# Patient Record
Sex: FEMALE | Race: ASIAN | NOT HISPANIC OR LATINO | Employment: FULL TIME | URBAN - METROPOLITAN AREA
[De-identification: names, ages, dates, MRNs, and addresses within clinical notes are randomized per-mention and may not be internally consistent; named-entity substitution may affect disease eponyms.]

---

## 2018-10-26 ENCOUNTER — OFFICE VISIT (OUTPATIENT)
Dept: INTERNAL MEDICINE | Age: 23
End: 2018-10-26

## 2018-10-26 VITALS
TEMPERATURE: 99.2 F | HEIGHT: 64 IN | WEIGHT: 140 LBS | HEART RATE: 70 BPM | OXYGEN SATURATION: 98 % | SYSTOLIC BLOOD PRESSURE: 110 MMHG | BODY MASS INDEX: 23.9 KG/M2 | DIASTOLIC BLOOD PRESSURE: 64 MMHG

## 2018-10-26 DIAGNOSIS — R53.83 FATIGUE, UNSPECIFIED TYPE: ICD-10-CM

## 2018-10-26 DIAGNOSIS — Z00.00 ROUTINE HEALTH MAINTENANCE: ICD-10-CM

## 2018-10-26 DIAGNOSIS — R10.13 EPIGASTRIC ABDOMINAL PAIN: Primary | ICD-10-CM

## 2018-10-26 PROCEDURE — 99204 OFFICE O/P NEW MOD 45 MIN: CPT | Performed by: INTERNAL MEDICINE

## 2018-10-26 RX ORDER — CYCLOBENZAPRINE HCL 10 MG
TABLET ORAL
Refills: 0 | COMMUNITY
Start: 2018-10-16 | End: 2018-10-26

## 2018-10-26 RX ORDER — PANTOPRAZOLE SODIUM 20 MG/1
20 TABLET, DELAYED RELEASE ORAL
COMMUNITY
Start: 2018-08-14 | End: 2018-10-26

## 2018-10-26 RX ORDER — PANTOPRAZOLE SODIUM 40 MG/1
40 TABLET, DELAYED RELEASE ORAL DAILY
Qty: 30 TABLET | Refills: 1 | Status: SHIPPED | OUTPATIENT
Start: 2018-10-26 | End: 2021-04-05

## 2018-10-26 NOTE — PROGRESS NOTES
"[facility] [place] INTERNAL MEDICINE  KISHA BLACK M.D.      Matthewramon Pierre / 22 y.o. / female  10/26/2018      ASSESSMENT & PLAN:    Problem List Items Addressed This Visit        High    Epigastric abdominal pain - Primary    Current Assessment & Plan     Possible gastritis/PUD. Check H. Pylori serology. Start pantoprazole 40 mg qd x 1 month. Avoid NSAIDS and caffeine. Consider EGD if not improved.          Relevant Medications    pantoprazole (PROTONIX) 40 MG EC tablet    Other Relevant Orders    Helicobacter Pylori, IgA IgG IgM      Other Visit Diagnoses     Fatigue, unspecified type        Relevant Orders    TSH+Free T4    Routine health maintenance        Relevant Orders    Ambulatory Referral to Gynecology    Flucelvax Quad=>4Years (PFS)        Orders Placed This Encounter   Procedures   • Flucelvax Quad=>4Years (PFS)   • Helicobacter Pylori, IgA IgG IgM   • TSH+Free T4   • Ambulatory Referral to Gynecology     New Medications Ordered This Visit   Medications   • pantoprazole (PROTONIX) 40 MG EC tablet     Sig: Take 1 tablet by mouth Daily.     Dispense:  30 tablet     Refill:  1       Summary/Discussion:      Return in about 3 months (around 1/26/2019) for Reassess today's problem(s).    ____________________________________________________________________    VITALS:    Visit Vitals  /64   Pulse 70   Temp 99.2 °F (37.3 °C)   Ht 162.6 cm (64\")   Wt 63.5 kg (140 lb)   SpO2 98%   BMI 24.03 kg/m²       BP Readings from Last 3 Encounters:   10/26/18 110/64   08/12/18 102/58   06/16/18 119/79     Wt Readings from Last 3 Encounters:   10/26/18 63.5 kg (140 lb)      Body mass index is 24.03 kg/m².    CC: Main reason(s) for today's visit: Establish Care (did not have former physician) and F/U ER visit Abdominal pain (8/14/2018)      HPI:    Patient is a 22 y.o. female who is here to establish care. She was seen at [facility] ED 8/14/18 for epigastric abdominal pain. CBC was normal. GB US was negative. She complains of ongoing dyspepsia " without melena or bleeding. Denies heartburn or dysphagia or early satiety. Drinks lot of caffeine/energy drinks. Denies heavy alcohol use. Takes OTC NSAIDS intermittently.       Patient Care Team:  Cyrus Case MD as PCP - General (Internal Medicine)  ____________________________________________________________________      REVIEW OF SYSTEMS    Review of Systems   Constitutional: Positive for fatigue and unexpected weight change (wt gain). Negative for appetite change.   HENT: Negative.    Eyes: Negative.    Respiratory: Negative.    Cardiovascular: Negative.    Gastrointestinal: Positive for abdominal pain. Negative for blood in stool.   Endocrine: Negative.    Genitourinary: Negative.    Musculoskeletal: Negative.    Skin: Negative.    Allergic/Immunologic: Negative.    Neurological: Negative.    Hematological: Negative.    Psychiatric/Behavioral: Negative.        PHYSICAL EXAMINATION    Physical Exam   Constitutional: She is oriented to person, place, and time. She appears well-developed and well-nourished. No distress.   HENT:   Head: Normocephalic and atraumatic.   Right Ear: Tympanic membrane normal.   Left Ear: Tympanic membrane normal.   Mouth/Throat: Oropharynx is clear and moist and mucous membranes are normal. No oral lesions.   Eyes: Conjunctivae are normal. No scleral icterus.   Neck: Neck supple. No tracheal deviation present. No thyroid mass and no thyromegaly present.   Cardiovascular: Normal rate, regular rhythm, normal heart sounds and intact distal pulses.    Pulmonary/Chest: Effort normal and breath sounds normal.   Abdominal: Soft. Bowel sounds are normal. She exhibits no distension and no mass. There is no hepatosplenomegaly. There is tenderness in the epigastric area. There is no guarding and negative Byers's sign. No hernia.   Musculoskeletal: She exhibits no edema.   Lymphadenopathy:     She has no cervical adenopathy.        Right: No supraclavicular adenopathy present.        Left: No  supraclavicular adenopathy present.   Neurological: She is alert and oriented to person, place, and time. She has normal reflexes. No cranial nerve deficit. She exhibits normal muscle tone. Coordination normal.   Skin: Skin is warm. No rash noted. No pallor.   Psychiatric: She has a normal mood and affect. Her behavior is normal. Judgment and thought content normal.         REVIEWED DATA:    Labs:     [facility] ED labs: normal lipase, Hgb     No results found for: NA, K, AST, ALT, BUN, CREATININE, EGFRIFNONA, EGFRIFAFRI    No results found for: GLUCOSE, HGBA1C, MICROALBUR    No results found for: LDL, HDL, TRIG, CHOLHDLRATIO    No results found for: TSH, FREET4     No results found for: WBC, HGB, PLT      Imaging:   GB US negative     Medical Tests:        Summary of old records / correspondence / consultant report:        Request outside records:        ______________________________________________________________________    ALLERGIES  No Known Allergies     MEDICATIONS  Current Outpatient Prescriptions   Medication Sig Dispense Refill   • pantoprazole (PROTONIX) 40 MG EC tablet Take 1 tablet by mouth Daily. 30 tablet 1     No current facility-administered medications for this visit.        PFSH:     The following portions of the patient's history were reviewed and updated as appropriate: Allergies / Current Medications / Past Medical History / Surgical History / Social History / Family History    PROBLEM LIST   Patient Active Problem List   Diagnosis   • Epigastric abdominal pain       PAST MEDICAL HISTORY  History reviewed. No pertinent past medical history.    SURGICAL HISTORY  Past Surgical History:   Procedure Laterality Date   • HERNIA REPAIR      3 months       SOCIAL HISTORY  Social History     Social History   • Marital status: Single     Occupational History   • student      Social History Main Topics   • Smoking status: Never Smoker   • Smokeless tobacco: Never Used   • Alcohol use 1.2 - 1.8 oz/week     2 - 3  Standard drinks or equivalent per week   • Drug use: No   • Sexual activity: Not Currently     Other Topics Concern   • Not on file       FAMILY HISTORY  Family History   Problem Relation Age of Onset   • Breast cancer Mother 45   • Hypertension Father    • Diabetes type II Father    • Pancreatic cancer Paternal Grandfather    • No Known Problems Sister    • No Known Problems Brother          **Larry Disclaimer:   Much of this encounter note is an electronic transcription/translation of spoken language to printed text. The electronic translation of spoken language may permit erroneous, or at times, nonsensical words or phrases to be inadvertently transcribed. Although I have reviewed the note for such errors, some may still exist.

## 2018-10-26 NOTE — ASSESSMENT & PLAN NOTE
Possible gastritis/PUD. Check H. Pylori serology. Start pantoprazole 40 mg qd x 1 month. Avoid NSAIDS and caffeine. Consider EGD if not improved.

## 2018-10-30 LAB
H PYLORI IGA SER-ACNC: <9 UNITS (ref 0–8.9)
H PYLORI IGG SER IA-ACNC: 0.24 INDEX VALUE (ref 0–0.79)
H PYLORI IGM SER-ACNC: <9 UNITS (ref 0–8.9)
T4 FREE SERPL-MCNC: 1.13 NG/DL (ref 0.93–1.7)
TSH SERPL DL<=0.005 MIU/L-ACNC: 1.31 MIU/ML (ref 0.27–4.2)

## 2018-10-31 ENCOUNTER — TELEPHONE (OUTPATIENT)
Dept: INTERNAL MEDICINE | Age: 23
End: 2018-10-31

## 2018-10-31 NOTE — TELEPHONE ENCOUNTER
----- Message from Cyrus Case MD sent at 10/30/2018  5:15 PM EDT -----  Send result letter to patient.     Sandip, these are your recent lab results:    H. Pylori bacteria serology/antibody tests were negative.   Thyroid level is fine.     Continue plans as discussed.     Please call my office if you have any questions. Otherwise, we can discuss the results in further detail on your next visit.

## 2018-11-02 ENCOUNTER — FLU SHOT (OUTPATIENT)
Dept: INTERNAL MEDICINE | Age: 23
End: 2018-11-02

## 2018-11-02 DIAGNOSIS — Z23 FLU VACCINE NEED: ICD-10-CM

## 2018-11-02 PROCEDURE — 90674 CCIIV4 VAC NO PRSV 0.5 ML IM: CPT | Performed by: INTERNAL MEDICINE

## 2018-11-02 PROCEDURE — 90471 IMMUNIZATION ADMIN: CPT | Performed by: INTERNAL MEDICINE

## 2018-12-12 ENCOUNTER — OFFICE VISIT (OUTPATIENT)
Dept: OBSTETRICS AND GYNECOLOGY | Facility: CLINIC | Age: 23
End: 2018-12-12

## 2018-12-12 VITALS
SYSTOLIC BLOOD PRESSURE: 118 MMHG | BODY MASS INDEX: 22.98 KG/M2 | HEIGHT: 64 IN | DIASTOLIC BLOOD PRESSURE: 68 MMHG | WEIGHT: 134.6 LBS

## 2018-12-12 DIAGNOSIS — Z00.00 ANNUAL PHYSICAL EXAM: Primary | ICD-10-CM

## 2018-12-12 PROCEDURE — 99385 PREV VISIT NEW AGE 18-39: CPT | Performed by: OBSTETRICS & GYNECOLOGY

## 2018-12-12 NOTE — PROGRESS NOTES
ROSALIO Pierre  is a 23 y.o. female who presents for a routine gynecologic exam.  She has never had one before.  She was referred by her primary care physician to initiate care.  She reports that her cycle is regular and predictable.  She is not currently sexually active, but has been in the past.  She does experience intermittent breast pain around the time of her cycle.    Chief Complaint   Patient presents with   • New Gyn     Patient is here for a new gyn annual with breast pain.       History reviewed. No pertinent past medical history.    Past Surgical History:   Procedure Laterality Date   • HERNIA REPAIR      3 months       Social History     Socioeconomic History   • Marital status: Single     Spouse name: Not on file   • Number of children: Not on file   • Years of education: Not on file   • Highest education level: Not on file   Social Needs   • Financial resource strain: Not on file   • Food insecurity - worry: Not on file   • Food insecurity - inability: Not on file   • Transportation needs - medical: Not on file   • Transportation needs - non-medical: Not on file   Occupational History   • Occupation: student   Tobacco Use   • Smoking status: Never Smoker   • Smokeless tobacco: Never Used   Substance and Sexual Activity   • Alcohol use: Yes     Alcohol/week: 1.2 - 1.8 oz     Types: 2 - 3 Standard drinks or equivalent per week   • Drug use: No   • Sexual activity: Not Currently   Other Topics Concern   • Not on file   Social History Narrative   • Not on file       The following portions of the patient's history were reviewed and updated as appropriate: allergies, current medications, past family history, past medical history, past social history, past surgical history and problem list.    Review of Systems   Constitutional: Negative.    HENT: Negative.    Respiratory: Negative.    Cardiovascular: Negative.    Gastrointestinal: Negative.    Genitourinary: Negative.    Musculoskeletal: Negative.     Skin: Negative.    Allergic/Immunologic: Negative.    Psychiatric/Behavioral: Negative.              Physical Exam   Constitutional: She is oriented to person, place, and time. She appears well-developed and well-nourished.   HENT:   Head: Normocephalic and atraumatic.   Cardiovascular: Normal rate and regular rhythm.   Pulmonary/Chest: Effort normal and breath sounds normal. She has no wheezes. She has no rales.   The breasts are equal in size.  There are no palpable lumps.  Nipple discharge and axillary adenopathy are absent.   Abdominal: Soft. She exhibits no distension. There is no tenderness.   Genitourinary: Vagina normal and uterus normal. There is no lesion on the right labia. There is no lesion on the left labia. Cervix exhibits no motion tenderness. Right adnexum displays no mass and no tenderness. Left adnexum displays no mass and no tenderness. No vaginal discharge found.   Neurological: She is alert and oriented to person, place, and time.   Skin: Skin is warm and dry.   Nursing note and vitals reviewed.      Assessment    aSndip was seen today for new gyn.    Diagnoses and all orders for this visit:    Annual physical exam        Plan  1. Normal gynecologic exam.  Pap performed.  2. Counseled regarding safe sex practices.  Currently, the patient uses condoms when having intercourse.  She declines any other form of contraception.  3. Intermittent breast pain around the time of menstruation.  Counseled.  There are no abnormalities on breast examination today.  We discussed the possibility of controlling this through the use of oral contraceptive pills.  The patient declines this for now.    4. Return in about 1 year (around 12/12/2019).    Social History     Tobacco Use   Smoking Status Never Smoker   5.     6.

## 2018-12-14 LAB
C TRACH RRNA CVX QL NAA+PROBE: NEGATIVE
CONV .: NORMAL
CYTOLOGIST CVX/VAG CYTO: NORMAL
CYTOLOGY CVX/VAG DOC THIN PREP: NORMAL
DX ICD CODE: NORMAL
HIV 1 & 2 AB SER-IMP: NORMAL
N GONORRHOEA RRNA CVX QL NAA+PROBE: NEGATIVE
OTHER STN SPEC: NORMAL
PATH REPORT.FINAL DX SPEC: NORMAL
STAT OF ADQ CVX/VAG CYTO-IMP: NORMAL
T VAGINALIS RRNA SPEC QL NAA+PROBE: NEGATIVE

## 2019-10-12 DIAGNOSIS — R10.13 EPIGASTRIC ABDOMINAL PAIN: ICD-10-CM

## 2019-10-14 RX ORDER — PANTOPRAZOLE SODIUM 40 MG/1
40 TABLET, DELAYED RELEASE ORAL DAILY
Qty: 30 TABLET | Refills: 0 | OUTPATIENT
Start: 2019-10-14

## 2019-11-14 ENCOUNTER — OFFICE VISIT (OUTPATIENT)
Dept: INTERNAL MEDICINE | Age: 24
End: 2019-11-14

## 2019-11-14 VITALS
OXYGEN SATURATION: 99 % | HEART RATE: 71 BPM | HEIGHT: 64 IN | WEIGHT: 150 LBS | TEMPERATURE: 97.3 F | BODY MASS INDEX: 25.61 KG/M2 | DIASTOLIC BLOOD PRESSURE: 70 MMHG | SYSTOLIC BLOOD PRESSURE: 100 MMHG

## 2019-11-14 DIAGNOSIS — K59.00 CONSTIPATION, UNSPECIFIED CONSTIPATION TYPE: Primary | ICD-10-CM

## 2019-11-14 DIAGNOSIS — K92.1 BLOOD IN STOOL: ICD-10-CM

## 2019-11-14 DIAGNOSIS — Z23 ENCOUNTER FOR IMMUNIZATION: ICD-10-CM

## 2019-11-14 PROCEDURE — 99213 OFFICE O/P EST LOW 20 MIN: CPT | Performed by: INTERNAL MEDICINE

## 2019-11-14 PROCEDURE — 90471 IMMUNIZATION ADMIN: CPT | Performed by: INTERNAL MEDICINE

## 2019-11-14 PROCEDURE — 90674 CCIIV4 VAC NO PRSV 0.5 ML IM: CPT | Performed by: INTERNAL MEDICINE

## 2019-11-14 RX ORDER — NORGESTIMATE AND ETHINYL ESTRADIOL 7DAYSX3 28
KIT ORAL
Refills: 0 | COMMUNITY
Start: 2019-10-11

## 2019-11-14 RX ORDER — MINOCYCLINE HYDROCHLORIDE 100 MG/1
CAPSULE ORAL
Refills: 0 | COMMUNITY
Start: 2019-10-11 | End: 2022-11-16

## 2019-11-14 NOTE — PATIENT INSTRUCTIONS
** IMPORTANT MESSAGE FROM DR. BLACK **    In our office, your satisfaction is VERY important to us.     You may receive a survey from [employer] by mail or E-mail for you to provide feedback about your visit. This information is invaluable for me to know what we can do to improve our services.     I ask that you please take a few minutes to complete the survey and let us know how we are doing in serving your needs. (You may receive the survey more than once for multiple visits)    Thank You !    Dr. Black & Staff    __________________________________________________________      ADDITIONAL INSTRUCTION / REMINDERS FROM DR. BLACK    FOR CONSTIPATION:    1. STAY ACTIVE  2. DRINK PLENTY OF WATER  3. PLENTY OF DIETARY FIBERS  4. BENEFIBER DAILY IN THE MORNING  5. STOOL SOFTENER (COLACE/DOCUSATE) 200 MG DAILY  6. MIRALAX POWDER 17 GM DAILY.   7. USE DULCOLAX STIMULANT LAXATIVE ONLY AS NEEDED  8. PREPARATION H MAXIMUM STRENGTH OINMENT NIGHTLY AND AFTER BOWEL MOVEMENTS

## 2019-11-14 NOTE — PROGRESS NOTES
"[facility] [place] [facility]NAL MEDICINE  KISHA BLACK M.D.      Sandip Pierre / 23 y.o. / female  11/14/2019      CC:  Main reason(s) for today's visit: Constipation      HPI:     1 month of worsening constipation after starting OCP and minocycline for acne. Has had very difficult bowel movements with blood on the stool/wiping. She has had weight gain from not watching her diet. Denies nausea/vomiting. No melena or blood in stool except as noted. Denies abdominal pain.       Patient Care Team:  Cyrus Black MD as PCP - General (Internal Medicine)    ASSESSMENT & PLAN:    1. Constipation, unspecified constipation type    2. Blood in stool    3. Encounter for immunization      Orders Placed This Encounter   Procedures   • Flucelvax Quad=>4Years ([id number])     No orders of the defined types were placed in this encounter.       Summary/Discussion:  Bowel regimen including increased water intake, staying active, colace, Benefiber, Miralax if needed. Dulcolax PRN only.   Preparation H Max as needed.   Instructed to call/return if not improved by 1 month or if worse. She expressed understanding and agreed to follow above instructions.       Next Appointment with me: Visit date not found    No Follow-up on file.    ____________________________________________________________________    MEDICATIONS  Current Outpatient Medications   Medication Sig Dispense Refill   • minocycline (MINOCIN,DYNACIN) 100 MG capsule   0   • TRI-SPRINTEC 0.18/0.215/0.25 MG-35 MCG per tablet   0   • pantoprazole (PROTONIX) 40 MG EC tablet Take 1 tablet by mouth Daily. 30 tablet 1     No current facility-administered medications for this visit.           ____________________________________________________________________      REVIEW OF SYSTEMS    Review of Systems  Weight gain  No nausea/vomiting, melena or abdominal pain   neg  Psych neg     VITALS    Visit Vitals  /70   Pulse 71   Temp 97.3 °F (36.3 °C)   Ht 162.6 cm (64\")   Wt 68 kg (150 lb)   SpO2 99%   BMI " 25.75 kg/m²       BP Readings from Last 3 Encounters:   11/14/19 100/70   12/12/18 118/68   10/26/18 110/64     Wt Readings from Last 3 Encounters:   11/14/19 68 kg (150 lb)   12/12/18 61.1 kg (134 lb 9.6 oz)   10/26/18 63.5 kg (140 lb)      Body mass index is 25.75 kg/m².    PHYSICAL EXAMINATION    Physical Exam   Constitutional:   Noted weight gain   Abdominal: Soft. Normal appearance and bowel sounds are normal. She exhibits no mass. There is no hepatosplenomegaly. There is no tenderness.   Psychiatric: She has a normal mood and affect. Judgment normal.           REVIEWED DATA:    Labs:   Lab Results   Component Value Date    WBC 6.86 08/14/2018    HGB 13.9 08/14/2018     08/14/2018      Lab Results   Component Value Date    TSH 1.310 10/26/2018    FREET4 1.13 10/26/2018        Imaging:        Medical Tests:       Summary of old records / correspondence / consultant report:        Request outside records:       ALLERGIES  No Known Allergies     PFSH:     The following portions of the patient's history were reviewed and updated as appropriate: Allergies / Current Medications / Past Medical History / Surgical History / Social History / Family History    PROBLEM LIST   Patient Active Problem List   Diagnosis   • Epigastric abdominal pain       PAST MEDICAL HISTORY  History reviewed. No pertinent past medical history.    SURGICAL HISTORY  Past Surgical History:   Procedure Laterality Date   • HERNIA REPAIR      3 months       SOCIAL HISTORY  Social History     Socioeconomic History   • Marital status: Single     Spouse name: Not on file   • Number of children: Not on file   • Years of education: Not on file   • Highest education level: Not on file   Occupational History   • Occupation: student   Tobacco Use   • Smoking status: Never Smoker   • Smokeless tobacco: Never Used   Substance and Sexual Activity   • Alcohol use: Yes     Alcohol/week: 1.2 - 1.8 oz     Types: 2 - 3 Standard drinks or equivalent per week    • Drug use: No   • Sexual activity: Not Currently       FAMILY HISTORY  Family History   Problem Relation Age of Onset   • Breast cancer Mother 45   • Hypertension Father    • Diabetes type II Father    • Pancreatic cancer Paternal Grandfather    • No Known Problems Sister    • No Known Problems Brother          **Larry Disclaimer:   Much of this encounter note is an electronic transcription/translation of spoken language to printed text. The electronic translation of spoken language may permit erroneous, or at times, nonsensical words or phrases to be inadvertently transcribed. Although I have reviewed the note for such errors, some may still exist.       Template created by Sophie Case MD

## 2020-08-07 ENCOUNTER — TELEPHONE (OUTPATIENT)
Dept: INTERNAL MEDICINE | Age: 25
End: 2020-08-07

## 2020-08-07 NOTE — TELEPHONE ENCOUNTER
Patient called our office stating her brother was having all the symptoms of COVID-19. The patient is not have any symptoms at this time and wanted to be COVID tested. I advised her to call [facility] to see what or where she needed to go for COVID testing.

## 2021-03-26 DIAGNOSIS — Z00.00 HEALTHCARE MAINTENANCE: Primary | ICD-10-CM

## 2021-03-29 ENCOUNTER — TELEPHONE (OUTPATIENT)
Dept: INTERNAL MEDICINE | Age: 26
End: 2021-03-29

## 2021-03-29 NOTE — TELEPHONE ENCOUNTER
Caller: Sandip Pierre    Relationship to patient: Self    Best call back number: [phone]    Chief complaint: BLOOD WORK    Type of visit: LAB    Requested date: ANY DAY BUT Wednesday IN THE AFTERNOON     If rescheduling, when is the original appointment: 03/29/21    Additional notes: THE PATIENT FORGOT ABOUT THE APPOINTMENT AND WOULD LIKE TO RESCHEDULE THE LAB. PLEASE RETURN CALL AND ADVISE.

## 2021-04-05 ENCOUNTER — OFFICE VISIT (OUTPATIENT)
Dept: INTERNAL MEDICINE | Age: 26
End: 2021-04-05

## 2021-04-05 VITALS
WEIGHT: 149.2 LBS | DIASTOLIC BLOOD PRESSURE: 72 MMHG | HEART RATE: 86 BPM | BODY MASS INDEX: 24.86 KG/M2 | SYSTOLIC BLOOD PRESSURE: 116 MMHG | TEMPERATURE: 98.4 F | HEIGHT: 65 IN | OXYGEN SATURATION: 99 %

## 2021-04-05 DIAGNOSIS — Z00.00 ENCOUNTER FOR ANNUAL PHYSICAL EXAM: Primary | ICD-10-CM

## 2021-04-05 DIAGNOSIS — Z12.4 SCREENING FOR CERVICAL CANCER: ICD-10-CM

## 2021-04-05 DIAGNOSIS — Z23 NEED FOR HPV VACCINATION: ICD-10-CM

## 2021-04-05 PROCEDURE — 99395 PREV VISIT EST AGE 18-39: CPT | Performed by: NURSE PRACTITIONER

## 2021-04-05 NOTE — PROGRESS NOTES
"[facility] [place]  JEAN Smallwood      Sandip Ignacio / 25 y.o. / female  04/05/2021      VITALS     Visit Vitals  /72   Pulse 86   Temp 98.4 °F (36.9 °C) (Temporal)   Ht 165.1 cm (65\")   Wt 67.7 kg (149 lb 3.2 oz)   LMP 04/01/2021 (Exact Date)   SpO2 99%   Breastfeeding No   BMI 24.83 kg/m²       BP Readings from Last 3 Encounters:   04/05/21 116/72   11/14/19 100/70   12/12/18 118/68     Wt Readings from Last 3 Encounters:   04/05/21 67.7 kg (149 lb 3.2 oz)   11/14/19 68 kg (150 lb)   12/12/18 61.1 kg (134 lb 9.6 oz)      Body mass index is 24.83 kg/m².    MEDICATIONS     Current Outpatient Medications   Medication Sig Dispense Refill   • minocycline (MINOCIN,DYNACIN) 100 MG capsule   0   • TRI-SPRINTEC 0.18/0.215/0.25 MG-35 MCG per tablet   0     Current Facility-Administered Medications   Medication Dose Route Frequency Provider Last Rate Last Admin   • HPV 9-Valent Recomb Vaccine suspension 1 dose  1 dose Intramuscular Once Casanova JEAN Olson           _____________________________________________________________________________________    CHIEF COMPLAINT     Annual Exam      HISTORY OF PRESENT ILLNESS     Sandip presents for annual health maintenance visit.    · Last health maintenance visit: approximately 3 years ago  · General health: good  · Lifestyle:  · Attempting to lose weight?: Yes   · Diet: eats moderately healthy  · Exercise: exercises 3 days/week  · Tobacco: Never used   · Alcohol: occasional/rare  · Work: Student  · Reproductive health:  · Sexually active?: Yes   · Sexual problems?: No problems  · Concern for STD?: No    · Sees Gynecologist?: No; referral today   · Viola/Postmenopausal?: No   · Domestic abuse concerns: No   · Depression Screening:      PHQ-2/PHQ-9 Depression Screening 4/5/2021   Little interest or pleasure in doing things 0   Feeling down, depressed, or hopeless 0   Total Score 0         PHQ-2: 0 (Not depressed)   PHQ-9: 0 (Negative screening for depression)    Patient " Care Team:  Cyrus Case MD as PCP - General (Internal Medicine)  Oscar Tavarez MD as Consulting Physician (Dermatology)  ______________________________________________________________________    ALLERGIES  No Known Allergies     PFSH:     The following portions of the patient's history were reviewed and updated as appropriate: Allergies / Current Medications / Past Medical History / Surgical History / Social History / Family History    PROBLEM LIST   Patient Active Problem List   Diagnosis   • Epigastric abdominal pain       PAST MEDICAL HISTORY  History reviewed. No pertinent past medical history.    SURGICAL HISTORY  Past Surgical History:   Procedure Laterality Date   • HERNIA REPAIR      3 months       SOCIAL HISTORY  Social History     Socioeconomic History   • Marital status: Single     Spouse name: Not on file   • Number of children: Not on file   • Years of education: Not on file   • Highest education level: Not on file   Tobacco Use   • Smoking status: Never Smoker   • Smokeless tobacco: Never Used   Vaping Use   • Vaping Use: Never used   Substance and Sexual Activity   • Alcohol use: Yes     Alcohol/week: 2.0 - 3.0 standard drinks     Types: 2 - 3 Standard drinks or equivalent per week   • Drug use: No   • Sexual activity: Yes     Partners: Male     Birth control/protection: Other, Condom       FAMILY HISTORY  Family History   Problem Relation Age of Onset   • Breast cancer Mother 45   • Hypertension Father    • Diabetes type II Father    • Pancreatic cancer Paternal Grandfather    • No Known Problems Sister    • No Known Problems Brother    • Stomach cancer Maternal Aunt        IMMUNIZATION HISTORY  Immunization History   Administered Date(s) Administered   • Flu Vaccine Split Quad 10/13/2020   • Influenza, Unspecified 09/09/2020   • TD Preservative Free 06/09/2018   • flucelvax quad pfs =>4 YRS 11/02/2018, 11/14/2019        ______________________________________________________________________    REVIEW OF SYSTEMS     Review of Systems   Constitutional: Negative.    HENT: Negative.    Eyes: Negative.    Respiratory: Negative.    Cardiovascular: Negative.    Gastrointestinal: Negative.    Endocrine: Negative.    Genitourinary: Negative.    Musculoskeletal: Negative.    Skin: Negative.    Allergic/Immunologic: Negative.    Neurological: Negative.    Hematological: Negative.    Psychiatric/Behavioral: Negative.          PHYSICAL EXAMINATION     Physical Exam  Constitutional:       Appearance: Normal appearance. She is normal weight.   HENT:      Head: Normocephalic and atraumatic.      Right Ear: Tympanic membrane normal.      Left Ear: Tympanic membrane normal.      Nose: Nose normal.      Mouth/Throat:      Mouth: Mucous membranes are moist.   Eyes:      Conjunctiva/sclera: Conjunctivae normal.      Pupils: Pupils are equal, round, and reactive to light.   Neck:      Thyroid: No thyromegaly.      Vascular: No carotid bruit.   Cardiovascular:      Rate and Rhythm: Normal rate and regular rhythm.      Pulses: Normal pulses.      Heart sounds: Normal heart sounds.   Pulmonary:      Effort: Pulmonary effort is normal.      Breath sounds: Normal breath sounds.   Abdominal:      Palpations: Abdomen is soft.      Tenderness: There is no abdominal tenderness. There is no right CVA tenderness or left CVA tenderness.   Genitourinary:     Comments: Referral to gynecology   Musculoskeletal:         General: Normal range of motion.      Cervical back: Normal range of motion.      Right lower leg: No edema.      Left lower leg: No edema.   Lymphadenopathy:      Cervical: No cervical adenopathy.   Skin:     General: Skin is warm and dry.   Neurological:      Mental Status: She is alert and oriented to person, place, and time.   Psychiatric:         Thought Content: Thought content normal.         Judgment: Judgment normal.         REVIEWED DATA       Labs:    Lab Results   Component Value Date     03/30/2021    K 4.3 03/30/2021    CALCIUM 9.4 03/30/2021    AST 14 03/30/2021    ALT 18 03/30/2021    BUN 12 03/30/2021    CREATININE 0.53 (L) 03/30/2021    CREATININE 0.5 (L) 08/14/2018    EGFRIFNONA 141 03/30/2021    EGFRIFAFRI >150 03/30/2021       Lab Results   Component Value Date    GLU 94 03/30/2021    HGBA1C 5.10 03/30/2021    TSH 2.490 03/30/2021    FREET4 1.13 10/26/2018       Lab Results   Component Value Date    LDL 62 03/30/2021    HDL 62 (H) 03/30/2021    TRIG 182 (H) 03/30/2021    CHOLHDLRATIO 2.48 03/30/2021       No results found for: [id number]     Lab Results   Component Value Date    WBC 8.47 03/30/2021    HGB 13.6 03/30/2021    MCV 88.0 03/30/2021     03/30/2021       Lab Results   Component Value Date    PROTEIN Negative 03/30/2021    GLUCOSEU Negative 03/30/2021    BLOODU Negative 03/30/2021    NITRITEU Negative 03/30/2021    LEUKOCYTESUR Negative 03/30/2021          Lab Results   Component Value Date    HEPCVIRUSABY <0.1 03/30/2021       Imaging:        Medical Tests:        ASSESSMENT & PLAN     ANNUAL WELLNESS EXAM / PHYSICAL     Other medical problems addressed today:      Summary/Discussion:     · Minimally elevated triglycerides in diet. Decrease saturated fats.   · Hep A, HPV recommended at pharmacy as first COVID-19 vaccination 04/15.   · Unable to complete pap smear today. On menses. Referral to gynecology. Also need to discuss mammogram with mother breast cancer history at 45.   · Follow-up in 1 year for annual physical     Next Appointment with me: Visit date not found    No follow-ups on file.      HEALTHCARE MAINTENANCE ISSUES     Cancer Screening:  · Colon: Initial/Next screening at age: 45  · Repeat colon cancer screening: N/A at this time  · Breast: Recommended monthly self exams; annual professional exam  · Mammogram: N/A at this time  · Cervical: 3 years  · Skin: Monthly self skin examination, annual exam by  health professional  · Lung: Does not meet criteria for lung cancer screening.   · Other:    Screening Labs & Tests:  · Lab results reviewed & discussed with the patient or test orders placed today.  · EKG:  · CV Screening: Lipid panel  · DEXA (65+ or postmenopausal with risk factors):   · HEP C (If born 0181-7[birth year], or risk factors): Negative screen  · Other:     Immunization/Vaccinations (to be given today unless deferred by patient)  · Influenza: Patient had the flu shot this season  · Hepatitis A: Recommended here or at pharmacy  · Tetanus/Pertussis: Up to date  · Pneumovax: Not needed at this time  · Shingles: Not needed at this time  · Other: HPV needed. COVID-19 vaccine April 15    Lifestyle Counseling:  · Lifestyle Modifications: Continue good lifestyle choices/modifications, Increase intensity/regularity of aerobic exercise and Follow a low fat, low cholesterol diet  · Safety Issues: Always wear seatbelt, Avoid texting while driving   · Use sunscreen, regular skin examination  · Recommended annual dental/vision examination.  · Emotional/Stress/Sleep: Reviewed and  given when appropriate      Health Maintenance   Topic Date Due   • HPV VACCINES (1 - 2-dose series) Never done   • PAP SMEAR  Never done   • INFLUENZA VACCINE  08/01/2021   • ANNUAL PHYSICAL  04/06/2022   • TDAP/TD VACCINES (2 - Tdap) 06/09/2028   • HEPATITIS C SCREENING  Completed   • Pneumococcal Vaccine 0-64  Aged Out   • MENINGOCOCCAL VACCINE  Aged Out         Examiner was wearing KN95 mask, face shield and exam gloves during the entire duration of the visit. Patient was masked the entire time.   Minimum social distance of 6 ft maintained entire visit except if physical contact was necessary as documented.     **Dragon Disclaimer:   Much of this encounter note is an electronic transcription/translation of spoken language to printed text. The electronic translation of spoken language may permit erroneous, or at times, nonsensical words or  phrases to be inadvertently transcribed. Although I have reviewed the note for such errors, some may still exist.

## 2021-04-08 ENCOUNTER — TELEPHONE (OUTPATIENT)
Dept: INTERNAL MEDICINE | Age: 26
End: 2021-04-08

## 2021-04-08 NOTE — TELEPHONE ENCOUNTER
----- Message from JEAN Smallwood sent at 4/8/2021 12:48 PM EDT -----  Please clarify with patient. When trying to refer to obgyn, patient is already established with Dr. London.     Is patient wanting new provider?   If not please remind her to schedule follow-up for pap smear and discussion of mammogram with family history of mother's breast cancer at age 45.     Thanks!

## 2021-04-08 NOTE — TELEPHONE ENCOUNTER
Pt is current with Dr. London, but is requesting new GYN that is female. Once this is set up she will have annual pap done. MM

## 2021-04-09 NOTE — TELEPHONE ENCOUNTER
This referral was routed back to [facility]. This referral show ready to randall with SHARAD Meneses. MM

## 2021-04-16 ENCOUNTER — BULK ORDERING (OUTPATIENT)
Dept: CASE MANAGEMENT | Facility: OTHER | Age: 26
End: 2021-04-16

## 2021-04-16 ENCOUNTER — APPOINTMENT (OUTPATIENT)
Dept: VACCINE CLINIC | Facility: HOSPITAL | Age: 26
End: 2021-04-16

## 2021-04-16 DIAGNOSIS — Z23 IMMUNIZATION DUE: ICD-10-CM

## 2021-08-09 ENCOUNTER — OFFICE VISIT (OUTPATIENT)
Dept: INTERNAL MEDICINE | Age: 26
End: 2021-08-09

## 2021-08-09 VITALS
OXYGEN SATURATION: 99 % | BODY MASS INDEX: 23.53 KG/M2 | TEMPERATURE: 98.4 F | DIASTOLIC BLOOD PRESSURE: 70 MMHG | HEART RATE: 94 BPM | SYSTOLIC BLOOD PRESSURE: 116 MMHG | WEIGHT: 141.2 LBS | HEIGHT: 65 IN

## 2021-08-09 DIAGNOSIS — Z71.85 IMMUNIZATION COUNSELING: Primary | ICD-10-CM

## 2021-08-09 PROCEDURE — 90471 IMMUNIZATION ADMIN: CPT | Performed by: NURSE PRACTITIONER

## 2021-08-09 PROCEDURE — 99213 OFFICE O/P EST LOW 20 MIN: CPT | Performed by: NURSE PRACTITIONER

## 2021-08-09 PROCEDURE — 90649 4VHPV VACCINE 3 DOSE IM: CPT | Performed by: NURSE PRACTITIONER

## 2021-08-09 NOTE — PROGRESS NOTES
"    I N T E R N A L  M E D I C I N E  JESSY SMITH, APRN      ENCOUNTER DATE:  08/09/2021    Sandip Pierre / 25 y.o. / female      CHIEF COMPLAINT / REASON FOR OFFICE VISIT     No chief complaint on file.      ASSESSMENT & PLAN     1. Immunization counseling  -Discussed HPV vaccination.  She will need 3 dose as she is 25 years old.  First dose will be administered today with second in 2 months and third 6 months from today's date.  -Follow-up with gynecology for Pap smear.    Orders Placed This Encounter   Procedures   • HPV Vaccine QuadriValent 3 Dose IM     No orders of the defined types were placed in this encounter.      SUMMARY/DISCUSSION  • Follow-up at next scheduled office visit with Dr. Case PCP in September.      Next Appointment with me: Visit date not found    No follow-ups on file.      VITAL SIGNS     Visit Vitals  /70   Pulse 94   Temp 98.4 °F (36.9 °C) (Temporal)   Ht 165.1 cm (65\")   Wt 64 kg (141 lb 3.2 oz)   LMP 07/22/2021 (Exact Date)   SpO2 99%   Breastfeeding No   BMI 23.50 kg/m²       Wt Readings from Last 3 Encounters:   08/09/21 64 kg (141 lb 3.2 oz)   04/05/21 67.7 kg (149 lb 3.2 oz)   11/14/19 68 kg (150 lb)     Body mass index is 23.5 kg/m².      MEDICATIONS AT THE TIME OF OFFICE VISIT     Current Outpatient Medications on File Prior to Visit   Medication Sig   • TRI-SPRINTEC 0.18/0.215/0.25 MG-35 MCG per tablet    • minocycline (MINOCIN,DYNACIN) 100 MG capsule      No current facility-administered medications on file prior to visit.         HISTORY OF PRESENT ILLNESS     Patient presents to discuss HPV vaccination.  Age of 25 with monogamous sexual partner male.  Declines any STI screenings.  She has gynecology appointment in the near future for Pap smear.  No GI symptoms such as vaginal discharge, pain, or genital warts.    REVIEW OF SYSTEMS     Constitutional neg except per HPI   Resp neg  CV neg   neg     PHYSICAL EXAMINATION     Physical Exam  Constitutional  No " distress  Cardiovascular Rate  normal . Rhythm: regular . Heart sounds:  normal  Pulmonary/Chest  Effort normal. Breath sounds:  normal  Psychiatric  Alert. Judgment and thought content normal. Mood normal     REVIEWED DATA     Labs:           Imaging:           Medical Tests:             Summary of old records / correspondence / consultant report:           Request outside records:           *Examiner was wearing medical surgical mask, face shield and exam gloves during the entire duration of the visit. Patient was masked the entire time.   Minimum social distance of 6 ft maintained entire visit except if physical contact was necessary as documented.     **Dragon Disclaimer:   Much of this encounter note is an electronic transcription/translation of spoken language to printed text. The electronic translation of spoken language may permit erroneous, or at times, nonsensical words or phrases to be inadvertently transcribed. Although I have reviewed the note for such errors, some may still exist.

## 2021-10-01 ENCOUNTER — TELEPHONE (OUTPATIENT)
Dept: INTERNAL MEDICINE | Age: 26
End: 2021-10-01

## 2021-10-01 DIAGNOSIS — N94.9 FEMALE GENITAL PROBLEM: Primary | ICD-10-CM

## 2021-10-01 NOTE — TELEPHONE ENCOUNTER
----- Message from Cyrus Case MD sent at 10/1/2021  9:30 AM EDT -----  Regarding: FW: Non-Urgent Medical Question  Contact: [phone]  See if she would like to see our APRN. If not okay to place referral to OBGYN.  ----- Message -----  From: Radha Banegas LPN  Sent: 10/1/2021   7:38 AM EDT  To: Cyrus Case MD  Subject: FW: Non-Urgent Medical Question                    ----- Message -----  From: Sandip Pierre  Sent: 9/30/2021  10:29 PM EDT  [facility]  Subject: Non-Urgent Medical Question                      Hello.    I keep having little pimples around my genital area. It doesn't sore too much unless not disturbed, but I just wanted to see if there's anyway to get this treated. If you could make me a referral to gynecologist, it would be great.    If you rather recommend me see dermatologist instead, I will do so. Thank you.    Nicki Oropeza.

## 2021-10-08 ENCOUNTER — CLINICAL SUPPORT (OUTPATIENT)
Dept: INTERNAL MEDICINE | Age: 26
End: 2021-10-08

## 2021-10-08 DIAGNOSIS — Z23 NEED FOR HPV VACCINATION: Primary | ICD-10-CM

## 2021-10-08 PROCEDURE — 90649 4VHPV VACCINE 3 DOSE IM: CPT | Performed by: INTERNAL MEDICINE

## 2021-10-08 PROCEDURE — 90471 IMMUNIZATION ADMIN: CPT | Performed by: INTERNAL MEDICINE

## 2021-12-13 ENCOUNTER — OFFICE VISIT (OUTPATIENT)
Dept: OBSTETRICS AND GYNECOLOGY | Age: 26
End: 2021-12-13

## 2021-12-13 VITALS
HEIGHT: 65 IN | WEIGHT: 142 LBS | DIASTOLIC BLOOD PRESSURE: 64 MMHG | BODY MASS INDEX: 23.66 KG/M2 | SYSTOLIC BLOOD PRESSURE: 102 MMHG

## 2021-12-13 DIAGNOSIS — N63.20 BREAST MASS, LEFT: Primary | ICD-10-CM

## 2021-12-13 DIAGNOSIS — Z11.3 SCREEN FOR STD (SEXUALLY TRANSMITTED DISEASE): ICD-10-CM

## 2021-12-13 DIAGNOSIS — Z01.419 WELL WOMAN EXAM WITH ROUTINE GYNECOLOGICAL EXAM: Primary | ICD-10-CM

## 2021-12-13 DIAGNOSIS — Z30.41 ORAL CONTRACEPTIVE PILL SURVEILLANCE: ICD-10-CM

## 2021-12-13 DIAGNOSIS — Z12.4 SCREENING FOR CERVICAL CANCER: ICD-10-CM

## 2021-12-13 DIAGNOSIS — Z80.3 FAMILY HISTORY OF BREAST CANCER IN MOTHER: ICD-10-CM

## 2021-12-13 DIAGNOSIS — N63.20 BREAST MASS, LEFT: ICD-10-CM

## 2021-12-13 PROCEDURE — 99213 OFFICE O/P EST LOW 20 MIN: CPT | Performed by: NURSE PRACTITIONER

## 2021-12-13 PROCEDURE — 99395 PREV VISIT EST AGE 18-39: CPT | Performed by: NURSE PRACTITIONER

## 2021-12-16 LAB
C TRACH RRNA CVX QL NAA+PROBE: NEGATIVE
CONV .: NORMAL
CYTOLOGIST CVX/VAG CYTO: NORMAL
CYTOLOGY CVX/VAG DOC CYTO: NORMAL
CYTOLOGY CVX/VAG DOC THIN PREP: NORMAL
DX ICD CODE: NORMAL
HIV 1 & 2 AB SER-IMP: NORMAL
N GONORRHOEA RRNA CVX QL NAA+PROBE: NEGATIVE
OTHER STN SPEC: NORMAL
STAT OF ADQ CVX/VAG CYTO-IMP: NORMAL

## 2022-01-17 ENCOUNTER — APPOINTMENT (OUTPATIENT)
Dept: WOMENS IMAGING | Facility: HOSPITAL | Age: 27
End: 2022-01-17

## 2022-01-17 PROCEDURE — 76641 ULTRASOUND BREAST COMPLETE: CPT | Performed by: RADIOLOGY

## 2022-01-24 DIAGNOSIS — N63.20 BREAST MASS, LEFT: Primary | ICD-10-CM

## 2022-01-24 DIAGNOSIS — R92.8 ABNORMAL FINDING ON BREAST IMAGING: ICD-10-CM

## 2022-01-24 DIAGNOSIS — R92.8 ABNORMAL FINDING ON BREAST IMAGING: Primary | ICD-10-CM

## 2022-02-15 ENCOUNTER — APPOINTMENT (OUTPATIENT)
Dept: WOMENS IMAGING | Facility: HOSPITAL | Age: 27
End: 2022-02-15

## 2022-02-15 PROCEDURE — 19083 BX BREAST 1ST LESION US IMAG: CPT | Performed by: RADIOLOGY

## 2022-02-15 PROCEDURE — 19084 BX BREAST ADD LESION US IMAG: CPT | Performed by: RADIOLOGY

## 2022-02-15 PROCEDURE — A4648 IMPLANTABLE TISSUE MARKER: HCPCS | Performed by: RADIOLOGY

## 2022-02-17 ENCOUNTER — CLINICAL SUPPORT (OUTPATIENT)
Dept: INTERNAL MEDICINE | Age: 27
End: 2022-02-17

## 2022-02-17 DIAGNOSIS — Z23 NEED FOR HPV VACCINATION: Primary | ICD-10-CM

## 2022-02-17 PROCEDURE — 90649 4VHPV VACCINE 3 DOSE IM: CPT | Performed by: NURSE PRACTITIONER

## 2022-02-17 PROCEDURE — 90471 IMMUNIZATION ADMIN: CPT | Performed by: NURSE PRACTITIONER

## 2022-02-18 DIAGNOSIS — R92.8 ABNORMAL FINDING ON BREAST IMAGING: Primary | ICD-10-CM

## 2022-08-12 ENCOUNTER — TELEPHONE (OUTPATIENT)
Dept: OBSTETRICS AND GYNECOLOGY | Age: 27
End: 2022-08-12

## 2022-08-12 DIAGNOSIS — R92.8 ABNORMAL FINDING ON BREAST IMAGING: Primary | ICD-10-CM

## 2022-08-12 NOTE — TELEPHONE ENCOUNTER
Caller: ANNETTA SHEFFIELD    Relationship: [facility] [place]    Best call back number: [phone]    What orders are you requesting (i.e. lab or imaging): BILATERAL BREAST U/S LIMITED    In what timeframe would the patient need to come in: ASAP (PT COMING IN ON Monday)    Where will you receive your lab/imaging services: [facility]    FAX #: [phone]

## 2023-03-03 DIAGNOSIS — Z00.00 ROUTINE HEALTH MAINTENANCE: Primary | ICD-10-CM

## 2023-03-23 NOTE — PROGRESS NOTES
"Chief Complaint  New Gyn (Patient is here to discuss irregular menses. Last pap 2021- neg. Patient states that she has been off oral contraceptives \"for awhile now\" )    Subjective        Matthew Pierre presents to [facility]  History of Present Illness  Patient for evaluation of menstrual changes.  She says for about the last 3-4 months her periods have been irregularly spaced.  They have started coming later than what she was expecting.  At first they were about a week late, but in February she missed her period altogether.  She says she did take a pregnancy test at that time, but it was negative.  She ended up having a period about 3 weeks later than would be expected.  Patient also reports having intermittent lower abdominal/pelvic pain which is crampy in nature.  This also began about 3-4 months ago.  Says it seems to come randomly and lasts a short amount of time.  Patient does report a new job which has been very stressful for her.  She otherwise denies significant physical or emotional stress.  She denies weight changes.   Patient had been on Sprintec birth control pills until about 6-12 months ago.  She stopped this because of insurance issues but states that she would be interested in restarting this.    Last Pap 2001: Negative    Menstrual History:  OB History        0    Para   0    Term   0       0    AB   0    Living   0       SAB   0    IAB   0    Ectopic   0    Molar   0    Multiple   0    Live Births   0          Obstetric Comments   No children              Patient's last menstrual period was 2023.     Past Medical History:   Diagnosis Date   • Urinary tract infection        Past Surgical History:   Procedure Laterality Date   • HERNIA REPAIR      3 months   • WISDOM TOOTH EXTRACTION         Social History     Tobacco Use   • Smoking status: Never   • Smokeless tobacco: Never   Vaping Use   • Vaping Use: Never used   Substance Use " Topics   • Alcohol use: Yes     Comment: occ   • Drug use: No       Family History   Problem Relation Age of Onset   • Hypertension Father    • Diabetes type II Father    • Breast cancer Mother    • No Known Problems Brother    • No Known Problems Sister    • Pancreatic cancer Paternal Grandfather    • Stomach cancer Maternal Aunt    • Ovarian cancer Neg Hx    • Uterine cancer Neg Hx    • Colon cancer Neg Hx        Social History     Tobacco Use   • Smoking status: Never   • Smokeless tobacco: Never   Vaping Use   • Vaping Use: Never used   Substance Use Topics   • Alcohol use: Yes     Comment: occ   • Drug use: No       Current Outpatient Medications on File Prior to Visit   Medication Sig   • spironolactone (ALDACTONE) 50 MG tablet  (Patient not taking: Reported on 3/24/2023)   • sulfamethoxazole-trimethoprim (BACTRIM DS,SEPTRA DS) 800-160 MG per tablet  (Patient not taking: Reported on 3/24/2023)   • TRI-SPRINTEC 0.18/0.215/0.25 MG-35 MCG per tablet  (Patient not taking: Reported on 3/24/2023)     No current facility-administered medications on file prior to visit.       No Known Allergies    ROS:  Constitutional: No fevers, chills, sweats   Eye: No recent visual problems, denies blurry vision   HEENT: No ear pain, nasal congestion, sore throat, voice changes   Respiratory: No shortness of breath, cough, pain on breathing   Cardiovascular: No Chest pain, palpitations   Gastrointestinal: No nausea, vomiting, diarrhea, constipation   Genitourinary: No hematuria, dysuria, lesions on genitalia   Hema/Lymph: Negative for bruising, no edema   Endocrine: Negative for excessive thirst, excessive hunger, heat or cold intolerance   Musculoskeletal: No joint pain, muscle pain, decreased range of motion   Integumentary: No rash, pruritus, abrasions, lesions   Neurologic: No weakness, numbness, headaches   Psychiatric: No anxiety, depression, mood changes     Objective   Vital Signs:  /82   Pulse 79   Ht 165.1 cm  "(65\")   Wt 69.9 kg (154 lb)   BMI 25.63 kg/m²   Estimated body mass index is 25.63 kg/m² as calculated from the following:    Height as of this encounter: 165.1 cm (65\").    Weight as of this encounter: 69.9 kg (154 lb).       BMI is within normal parameters. No other follow-up for BMI required.      Physical Exam   Exam performed in the presence of a female chaperone  Patient has provided verbal consent to proceed with exam.    Gen: No acute distress, awake and oriented times three  HENT: Normocephalic, atraumatic, Moist mucous membranes  Eyes: PERRLA, EOMI  Lungs: Normal work of breathing, lungs clear bilaterally, no crackles/wheezes  Heart: Regular rate and rhythm, no murmurs  Breast: Symmetrical. No skin changes or nipple retractions. No lumps or masses bilaterally. No tenderness bilaterally.  Abdomen: soft, nontender, no masses or hernia, no hepatosplenomegaly, non distended, normoactive bowel sounds  Normal external female genitalia, no lesions  Urethra: Normal meatus, no caruncle  Bladder: nontender  Vagina: No blood or discharge  Cervix: No cervical motion tenderness, no lesions, no active bleeding, nonfriable  Uterus: Anteverted, normal size and shape, nontender  Adnexa: No masses or tenderness  External anal exam: Normal appearance, no lesions or hemorrhoids  Rectal: Deferred  Skin: Warm and dry, no rashes  Psych: Good judgement and insight, normal affect and mood  Neuro: CN 2-12 intact, no gross deficits      Result Review :                   Assessment and Plan   Diagnoses and all orders for this visit:    1. Irregular menses (Primary)  -     Testosterone  -     Testosterone Free Direct  -     Prolactin  -     Follicle Stimulating Hormone  -     Luteinizing Hormone  -     Hemoglobin A1c  -     TSH Rfx On Abnormal To Free T4  -     US Non-ob Transvaginal; Future    2. Screen for sexually transmitted diseases  -     Chlamydia trachomatis, Neisseria gonorrhoeae, Trichomonas vaginalis, PCR - Swab, " Vagina    3. Screening for diabetes mellitus  -     Hemoglobin A1c    4. Screening for thyroid disorder  -     TSH Rfx On Abnormal To Free T4    5. Pelvic pain  -     US Non-ob Transvaginal; Future    Other orders  -     Cancel: IGP,CtNgTv,rfx Apt HPV All    We will check labs as above to evaluate for menstrual irregularities.  Get ultrasound to evaluate for irregular menses and pain as well.  Recommend gonorrhea and Chlamydia cultures to exclude occult infectious source.  Overall, feel PCOS is the most likely diagnosis.    Patient reports that ultimately she would be interested in getting back on birth control pills again.    --       Follow Up   No follow-ups on file.  Patient was given instructions and counseling regarding her condition or for health maintenance advice. Please see specific information pulled into the AVS if appropriate.

## 2023-03-24 ENCOUNTER — OFFICE VISIT (OUTPATIENT)
Dept: OBSTETRICS AND GYNECOLOGY | Facility: CLINIC | Age: 28
End: 2023-03-24
Payer: COMMERCIAL

## 2023-03-24 VITALS
DIASTOLIC BLOOD PRESSURE: 82 MMHG | HEIGHT: 65 IN | BODY MASS INDEX: 25.66 KG/M2 | SYSTOLIC BLOOD PRESSURE: 117 MMHG | WEIGHT: 154 LBS | HEART RATE: 79 BPM

## 2023-03-24 DIAGNOSIS — Z13.1 SCREENING FOR DIABETES MELLITUS: ICD-10-CM

## 2023-03-24 DIAGNOSIS — Z11.3 SCREEN FOR SEXUALLY TRANSMITTED DISEASES: ICD-10-CM

## 2023-03-24 DIAGNOSIS — Z13.29 SCREENING FOR THYROID DISORDER: ICD-10-CM

## 2023-03-24 DIAGNOSIS — N92.6 IRREGULAR MENSES: Primary | ICD-10-CM

## 2023-03-24 DIAGNOSIS — R10.2 PELVIC PAIN: ICD-10-CM

## 2023-03-25 LAB
FSH SERPL-ACNC: 6.6 MIU/ML
HBA1C MFR BLD: 5.4 % (ref 4.8–5.6)
LH SERPL-ACNC: 11.4 MIU/ML
PROLACTIN SERPL-MCNC: 10.5 NG/ML (ref 4.8–23.3)
TESTOST SERPL-MCNC: 47 NG/DL (ref 13–71)
TSH SERPL DL<=0.005 MIU/L-ACNC: 1.55 UIU/ML (ref 0.45–4.5)

## 2023-03-27 LAB
C TRACH RRNA SPEC QL NAA+PROBE: NEGATIVE
N GONORRHOEA RRNA SPEC QL NAA+PROBE: NEGATIVE
T VAGINALIS RRNA SPEC QL NAA+PROBE: NEGATIVE

## 2023-03-29 LAB — TESTOST FREE SERPL-MCNC: 2.1 PG/ML (ref 0–4.2)

## 2023-04-05 ENCOUNTER — PATIENT ROUNDING (BHMG ONLY) (OUTPATIENT)
Dept: OBSTETRICS AND GYNECOLOGY | Facility: CLINIC | Age: 28
End: 2023-04-05
Payer: COMMERCIAL

## 2023-04-05 ENCOUNTER — PATIENT MESSAGE (OUTPATIENT)
Dept: OBSTETRICS AND GYNECOLOGY | Facility: CLINIC | Age: 28
End: 2023-04-05
Payer: COMMERCIAL

## 2023-04-05 NOTE — PROGRESS NOTES
My chart message has been sent to the patient for PATIENT ROUNDING with [facility] [place].

## 2024-06-13 ENCOUNTER — OFFICE VISIT (OUTPATIENT)
Dept: INTERNAL MEDICINE | Age: 29
End: 2024-06-13
Payer: COMMERCIAL

## 2024-06-13 VITALS
WEIGHT: 163 LBS | HEIGHT: 65 IN | BODY MASS INDEX: 27.16 KG/M2 | DIASTOLIC BLOOD PRESSURE: 88 MMHG | TEMPERATURE: 97.1 F | OXYGEN SATURATION: 100 % | SYSTOLIC BLOOD PRESSURE: 110 MMHG | HEART RATE: 73 BPM

## 2024-06-13 DIAGNOSIS — Z00.00 ENCOUNTER FOR ANNUAL HEALTH EXAMINATION: Primary | ICD-10-CM

## 2024-06-13 DIAGNOSIS — E66.3 OVERWEIGHT (BMI 25.0-29.9): ICD-10-CM

## 2024-06-13 PROBLEM — N92.6 IRREGULAR MENSES: Status: RESOLVED | Noted: 2023-03-24 | Resolved: 2024-06-13

## 2024-06-13 PROBLEM — R10.13 EPIGASTRIC ABDOMINAL PAIN: Status: RESOLVED | Noted: 2018-10-26 | Resolved: 2024-06-13

## 2024-06-13 PROBLEM — R10.2 PELVIC PAIN: Status: RESOLVED | Noted: 2023-03-24 | Resolved: 2024-06-13

## 2024-06-13 PROBLEM — Z80.3 FAMILY HISTORY OF BREAST CANCER IN MOTHER: Status: RESOLVED | Noted: 2021-12-13 | Resolved: 2024-06-13

## 2024-06-13 LAB
ALBUMIN SERPL-MCNC: 4.8 G/DL (ref 3.5–5.2)
ALBUMIN/GLOB SERPL: 2.3 G/DL
ALP SERPL-CCNC: 75 U/L (ref 39–117)
ALT SERPL-CCNC: 14 U/L (ref 1–33)
APPEARANCE UR: CLEAR
AST SERPL-CCNC: 15 U/L (ref 1–32)
BASOPHILS # BLD AUTO: 0.06 10*3/MM3 (ref 0–0.2)
BASOPHILS NFR BLD AUTO: 0.6 % (ref 0–1.5)
BILIRUB SERPL-MCNC: 0.8 MG/DL (ref 0–1.2)
BILIRUB UR QL STRIP: NEGATIVE
BUN SERPL-MCNC: 10 MG/DL (ref 6–20)
BUN/CREAT SERPL: 18.5 (ref 7–25)
CALCIUM SERPL-MCNC: 9.4 MG/DL (ref 8.6–10.5)
CHLORIDE SERPL-SCNC: 105 MMOL/L (ref 98–107)
CHOLEST SERPL-MCNC: 161 MG/DL (ref 0–200)
CHOLEST/HDLC SERPL: 3.66 {RATIO}
CO2 SERPL-SCNC: 23.7 MMOL/L (ref 22–29)
COLOR UR: YELLOW
CREAT SERPL-MCNC: 0.54 MG/DL (ref 0.57–1)
EGFRCR SERPLBLD CKD-EPI 2021: 128.8 ML/MIN/1.73
EOSINOPHIL # BLD AUTO: 0.21 10*3/MM3 (ref 0–0.4)
EOSINOPHIL NFR BLD AUTO: 2.3 % (ref 0.3–6.2)
ERYTHROCYTE [DISTWIDTH] IN BLOOD BY AUTOMATED COUNT: 13.1 % (ref 12.3–15.4)
GLOBULIN SER CALC-MCNC: 2.1 GM/DL
GLUCOSE SERPL-MCNC: 79 MG/DL (ref 65–99)
GLUCOSE UR QL STRIP: NEGATIVE
HBA1C MFR BLD: 5.2 % (ref 4.8–5.6)
HCT VFR BLD AUTO: 42.8 % (ref 34–46.6)
HDLC SERPL-MCNC: 44 MG/DL (ref 40–60)
HGB BLD-MCNC: 13.6 G/DL (ref 12–15.9)
HGB UR QL STRIP: NEGATIVE
IMM GRANULOCYTES # BLD AUTO: 0.02 10*3/MM3 (ref 0–0.05)
IMM GRANULOCYTES NFR BLD AUTO: 0.2 % (ref 0–0.5)
KETONES UR QL STRIP: NEGATIVE
LDLC SERPL CALC-MCNC: 88 MG/DL (ref 0–100)
LEUKOCYTE ESTERASE UR QL STRIP: NEGATIVE
LYMPHOCYTES # BLD AUTO: 2.43 10*3/MM3 (ref 0.7–3.1)
LYMPHOCYTES NFR BLD AUTO: 26.2 % (ref 19.6–45.3)
MCH RBC QN AUTO: 27.6 PG (ref 26.6–33)
MCHC RBC AUTO-ENTMCNC: 31.8 G/DL (ref 31.5–35.7)
MCV RBC AUTO: 87 FL (ref 79–97)
MONOCYTES # BLD AUTO: 0.34 10*3/MM3 (ref 0.1–0.9)
MONOCYTES NFR BLD AUTO: 3.7 % (ref 5–12)
NEUTROPHILS # BLD AUTO: 6.22 10*3/MM3 (ref 1.7–7)
NEUTROPHILS NFR BLD AUTO: 67 % (ref 42.7–76)
NITRITE UR QL STRIP: NEGATIVE
NRBC BLD AUTO-RTO: 0 /100 WBC (ref 0–0.2)
PH UR STRIP: 5.5 [PH] (ref 5–8)
PLATELET # BLD AUTO: 294 10*3/MM3 (ref 140–450)
POTASSIUM SERPL-SCNC: 4 MMOL/L (ref 3.5–5.2)
PROT SERPL-MCNC: 6.9 G/DL (ref 6–8.5)
PROT UR QL STRIP: NEGATIVE
RBC # BLD AUTO: 4.92 10*6/MM3 (ref 3.77–5.28)
SODIUM SERPL-SCNC: 138 MMOL/L (ref 136–145)
SP GR UR STRIP: 1.02 (ref 1–1.03)
T4 FREE SERPL-MCNC: 1.08 NG/DL (ref 0.92–1.68)
TRIGL SERPL-MCNC: 169 MG/DL (ref 0–150)
TSH SERPL DL<=0.005 MIU/L-ACNC: 1.24 UIU/ML (ref 0.27–4.2)
UROBILINOGEN UR STRIP-MCNC: NORMAL MG/DL
VLDLC SERPL CALC-MCNC: 29 MG/DL (ref 5–40)
WBC # BLD AUTO: 9.28 10*3/MM3 (ref 3.4–10.8)

## 2024-06-13 PROCEDURE — 99395 PREV VISIT EST AGE 18-39: CPT | Performed by: INTERNAL MEDICINE

## 2024-06-13 NOTE — ASSESSMENT & PLAN NOTE
Wt Readings from Last 3 Encounters:   06/13/24 73.9 kg (163 lb)   03/24/23 69.9 kg (154 lb)   11/16/22 54.4 kg (120 lb)     Body mass index is 27.12 kg/m².     Maintain a low sugar/starch/carbohydrate diet. Increase physical activity/exercise. Weight loss of at least 10 lbs recommended between now and 6 months follow-up.

## 2024-06-13 NOTE — PROGRESS NOTES
"    I N T E R N A L  M E D I C I N E    J U N O H  K I M,  M D      ENCOUNTER DATE:  06/13/2024    Matthew Schuster Ignacio / 28 y.o. / female    CHIEF COMPLAINT     Annual Exam (4/5/21)      VITALS     Vitals:    06/13/24 1400   BP: 110/88   Pulse: 73   Temp: 97.1 °F (36.2 °C)   SpO2: 100%   Weight: 73.9 kg (163 lb)   Height: 165.1 cm (65\")       BP Readings from Last 3 Encounters:   06/13/24 110/88   03/24/23 117/82   11/16/22 115/76     Wt Readings from Last 3 Encounters:   06/13/24 73.9 kg (163 lb)   03/24/23 69.9 kg (154 lb)   11/16/22 54.4 kg (120 lb)      Body mass index is 27.12 kg/m².    Blood pressure readings recorded on patient flowsheet:       No data to display                  MEDICATIONS     No medications currently        HISTORY OF PRESENT ILLNESS     Matthew Schuster presents for annual health maintenance visit.    Patient complains of high degree of work-related stress.  She has to commute from [place] to [place] every day and reports to high degree of stress from work.  She tends to skip breakfast and lunch and eats a late heavy dinner on most days.  Her work is mostly sedentary and she does not exercise regularly.  She has gained substantial weight over the last several years.  She is mindful of her father's history of diabetes and expresses concern about her weight.  She has seen her gynecologist and was thought to possibly have PCOS but her A1c level was within normal.  Currently she does not use any permanent form of birth control but is sexually active and uses condoms.    Patient Care Team:  Cyrsu Case MD as PCP - General (Internal Medicine)  Oscar Tavarez MD as Consulting Physician (Dermatology)    General health: some medical problems  Lifestyle:  Attempting to lose weight?: No   Diet: has not been eating as healthy, tends to skip breakfast, tends to skip lunch, eats heavier dinner, eats too late, and snacks excessively  Exercise: generally sedentary  Tobacco: Never used   Alcohol: does not " drink  Work: Full-time  Reproductive health:  Sexually active?: Yes   Sexual problems?: No problems  Concern for STD?: No    Sees Gynecologist?: Yes   [place]/Postmenopausal?: No   Depression Screenin/13/2024     2:02 PM   PHQ-2/PHQ-9 Depression Screening   Little Interest or Pleasure in Doing Things 0-->not at all   Feeling Down, Depressed or Hopeless 0-->not at all   PHQ-9: Brief Depression Severity Measure Score 0         PHQ-2: 0 (Not depressed)   PHQ-9: 0 (Negative screening for depression)    ALLERGIES  No Known Allergies     PFSH:     The following portions of the patient's history were reviewed and updated as appropriate: Allergies / Current Medications / Past Medical History / Surgical History / Social History / Family History    PROBLEM LIST   Patient Active Problem List   Diagnosis    Overweight (BMI 25.0-29.9)       PAST MEDICAL HISTORY  Past Medical History:   Diagnosis Date    Urinary tract infection        SURGICAL HISTORY  Past Surgical History:   Procedure Laterality Date    HERNIA REPAIR      3 months    WISDOM TOOTH EXTRACTION         SOCIAL HISTORY  Social History     Socioeconomic History    Marital status: Single   Tobacco Use    Smoking status: Never    Smokeless tobacco: Never   Vaping Use    Vaping status: Never Used   Substance and Sexual Activity    Alcohol use: Yes     Comment: occ    Drug use: No    Sexual activity: Yes     Partners: Male     Birth control/protection: Condom       FAMILY HISTORY  Family History   Problem Relation Age of Onset    Breast cancer Mother 45    Hypertension Father     Diabetes type II Father     No Known Problems Sister     No Known Problems Brother     Dementia Maternal Grandmother     Sudden death Maternal Grandfather     No Known Problems Paternal Grandmother     Pancreatic cancer Paternal Grandfather     Stomach cancer Maternal Aunt     Ovarian cancer Neg Hx     Uterine cancer Neg Hx     Colon cancer Neg Hx        IMMUNIZATION  HISTORY  Immunization History   Administered Date(s) Administered    COVID-19 (PFIZER) BIVALENT 12+YRS 12/29/2022    COVID-19 (PFIZER) Purple Cap Monovalent 04/16/2021, 05/07/2021, 12/23/2021    Flu Vaccine Split Quad 10/13/2020    Fluzone (or Fluarix & Flulaval for VFC) >6mos 10/13/2020    HPV Quadrivalent 08/09/2021, 10/08/2021, 02/17/2022    Influenza, Unspecified 09/09/2020, 12/23/2021, 11/03/2022    TD Preservative Free (Tenivac) 06/09/2018    flucelvax quad pfs =>4 YRS 11/02/2018, 11/14/2019         REVIEW OF SYSTEMS     Review of Systems   Constitutional:  Positive for fatigue and unexpected weight change (gain).   Allergic/Immunologic: Positive for environmental allergies.   Psychiatric/Behavioral:  Positive for dysphoric mood.          PHYSICAL EXAMINATION     Physical Exam  Constitutional:       General: She is not in acute distress.     Appearance: She is well-developed.      Comments: Weight gain noted; overweight   HENT:      Head: Normocephalic and atraumatic.      Right Ear: Tympanic membrane, ear canal and external ear normal.      Left Ear: Tympanic membrane, ear canal and external ear normal.      Mouth/Throat:      Mouth: Mucous membranes are moist.      Pharynx: Oropharynx is clear.      Comments: Mellampati Airway Class 4   Eyes:      General: No scleral icterus.     Conjunctiva/sclera: Conjunctivae normal.      Pupils: Pupils are equal, round, and reactive to light.   Neck:      Thyroid: No thyroid mass or thyromegaly.      Vascular: No carotid bruit.      Trachea: No tracheal deviation.   Cardiovascular:      Rate and Rhythm: Normal rate and regular rhythm.      Pulses: Normal pulses.      Heart sounds: Normal heart sounds.   Pulmonary:      Effort: Pulmonary effort is normal.      Breath sounds: Normal breath sounds.   Abdominal:      General: There is no distension.      Palpations: Abdomen is soft. There is no mass.      Tenderness: There is no abdominal tenderness.      Hernia: No hernia  "is present.   Genitourinary:     Comments: Deferred to gyne/by patient unless specified otherwise.   Musculoskeletal:      Cervical back: Neck supple.      Right lower leg: No edema.      Left lower leg: No edema.   Lymphadenopathy:      Cervical: No cervical adenopathy.   Skin:     General: Skin is warm.      Coloration: Skin is not jaundiced or pale.      Findings: No rash.   Neurological:      General: No focal deficit present.      Mental Status: She is alert and oriented to person, place, and time.      Cranial Nerves: No cranial nerve deficit.      Motor: No abnormal muscle tone.      Coordination: Coordination normal.   Psychiatric:         Attention and Perception: Attention normal.         Mood and Affect: Depressed: mild dysphoric.         Speech: Speech normal.         Behavior: Behavior is withdrawn.         Thought Content: Thought content normal.         Cognition and Memory: Cognition normal.         Judgment: Judgment normal.         REVIEWED DATA      Labs:    Lab Results   Component Value Date     03/30/2021    K 4.3 03/30/2021    CALCIUM 9.4 03/30/2021    AST 14 03/30/2021    ALT 18 03/30/2021    BUN 12 03/30/2021    CREATININE 0.53 (L) 03/30/2021    CREATININE 0.5 (L) 08/14/2018    EGFRIFNONA 141 03/30/2021    EGFRIFAFRI >150 03/30/2021       Lab Results   Component Value Date    GLUCOSE 94 03/30/2021    HGBA1C 5.4 03/24/2023    HGBA1C 5.10 03/30/2021    TSH 1.550 03/24/2023    FREET4 1.13 10/26/2018       Lab Results   Component Value Date    LDL 62 03/30/2021    HDL 62 (H) 03/30/2021    TRIG 182 (H) 03/30/2021    CHOLHDLRATIO 2.48 03/30/2021     No results found for: \"[id number]\"     Lab Results   Component Value Date    WBC 8.47 03/30/2021    HGB 13.6 03/30/2021    MCV 88.0 03/30/2021     03/30/2021     Lab Results   Component Value Date    PROTEIN Negative 03/30/2021    GLUCOSEU Negative 03/30/2021    BLOODU Negative 03/30/2021    NITRITEU Negative 03/30/2021    LEUKOCYTESUR " Moderate (2+) (A) 11/16/2022     Lab Results   Component Value Date    HEPCVIRUSABY <0.1 03/30/2021       Imaging:                Medical Tests:              Summary of old records / correspondence / consultant report:               Request outside records:         ASSESSMENT & PLAN     ANNUAL WELLNESS EXAM / PHYSICAL     Other medical problems addressed today:  Problem List Items Addressed This Visit          Medium    Overweight (BMI 25.0-29.9) (Chronic)    Current Assessment & Plan     Wt Readings from Last 3 Encounters:   06/13/24 73.9 kg (163 lb)   03/24/23 69.9 kg (154 lb)   11/16/22 54.4 kg (120 lb)     Body mass index is 27.12 kg/m².     Maintain a low sugar/starch/carbohydrate diet. Increase physical activity/exercise. Weight loss of at least 10 lbs recommended between now and 6 months follow-up.          Other Visit Diagnoses       Encounter for annual health examination    -  Primary    Relevant Orders    Comprehensive Metabolic Panel    Lipid Panel With / Chol / HDL Ratio    CBC & Differential    Hemoglobin A1c    TSH+Free T4    Urinalysis With Microscopic If Indicated (No Culture) - Urine, Clean Catch            Summary/Discussion:         Next Appointment with me: Visit date not found    Return in about 6 months (around 12/13/2024) for Reassess chronic medical problems.      HEALTHCARE MAINTENANCE ISSUES     Cancer Screening:  Colon: Initial/Next screening at age: 45  Repeat colon cancer screening: N/A at this time  Breast: Recommended monthly self exams; annual professional exam  Mammogram: N/A at this time  Cervical: 1 year or 2 years  Skin: Monthly self skin examination, annual exam by health professional  Lung: Does not meet criteria for lung cancer screening.   Other:    Screening Labs & Tests:  Lab results reviewed & discussed with the patient or test orders placed today.  EKG:  CV Screening: Lipid panel  DEXA (65+ or postmenopausal with risk factors):   HEP C (If born 5095-[birth year], or risk  factors): Previously had negative screen  Other:     Immunization/Vaccinations (to be given today unless deferred by patient)  Influenza: Recommended annual influenza vaccine  Hepatitis A: Recommended here or at pharmacy  Hepatitis B: Verify immunization records  Tetanus/Pertussis: Up to date  Pneumococcal: Not needed at this time  Shingles: Not needed at this time  COVID: Does not plan to get the latest booster  RSV: Not indicated    Lifestyle Counseling:  Lifestyle Modifications: Attempt to lose weight, Improve dietary compliance, Begin progressive aerobic exercise program 3-5 days a week, Maintain a low sugar/carbohydrate diet, Make dinner the lightest meal of day, Improve sleep hygiene, Reduce exposure to stress if possible, Discussed better management of stress/anxiety, and Discussed sexual issues, safe sex practices, contraception  Safety Issues: Always wear seatbelt, Avoid texting while driving   Use sunscreen, regular skin examination  Recommended annual dental/vision examination.  Emotional/Stress/Sleep: Reviewed and  given when appropriate      Health Maintenance   Topic Date Due    BMI FOLLOWUP  Never done    ANNUAL PHYSICAL  04/05/2022    COVID-19 Vaccine (5 - 2023-24 season) 09/01/2023    INFLUENZA VACCINE  08/01/2024    PAP SMEAR  12/13/2024    TDAP/TD VACCINES (2 - Tdap) 06/09/2028    HEPATITIS C SCREENING  Completed    Pneumococcal Vaccine 0-64  Aged Out    CHLAMYDIA SCREENING  Discontinued